# Patient Record
Sex: FEMALE | Race: WHITE | NOT HISPANIC OR LATINO | URBAN - NONMETROPOLITAN AREA
[De-identification: names, ages, dates, MRNs, and addresses within clinical notes are randomized per-mention and may not be internally consistent; named-entity substitution may affect disease eponyms.]

---

## 2018-03-14 ENCOUNTER — OFFICE VISIT CONVERTED (OUTPATIENT)
Dept: FAMILY MEDICINE CLINIC | Age: 48
End: 2018-03-14
Attending: FAMILY MEDICINE

## 2018-03-19 ENCOUNTER — CONVERSION ENCOUNTER (OUTPATIENT)
Dept: OTHER | Facility: HOSPITAL | Age: 48
End: 2018-03-19

## 2018-03-19 LAB
AGE GDLN ACOG TESTING: NORMAL
HPV I/H RISK 4 DNA CVX QL PROBE+SIG AMP: NEGATIVE

## 2018-07-23 ENCOUNTER — OFFICE VISIT CONVERTED (OUTPATIENT)
Dept: FAMILY MEDICINE CLINIC | Age: 48
End: 2018-07-23
Attending: NURSE PRACTITIONER

## 2018-09-20 ENCOUNTER — CONVERSION ENCOUNTER (OUTPATIENT)
Dept: PODIATRY | Facility: CLINIC | Age: 48
End: 2018-09-20

## 2018-09-20 ENCOUNTER — OFFICE VISIT CONVERTED (OUTPATIENT)
Dept: PODIATRY | Facility: CLINIC | Age: 48
End: 2018-09-20
Attending: PODIATRIST

## 2020-07-01 ENCOUNTER — HOSPITAL ENCOUNTER (OUTPATIENT)
Dept: OTHER | Facility: HOSPITAL | Age: 50
Discharge: HOME | End: 2020-07-01
Attending: FAMILY MEDICINE

## 2020-08-06 ENCOUNTER — OFFICE VISIT CONVERTED (OUTPATIENT)
Dept: PODIATRY | Facility: CLINIC | Age: 50
End: 2020-08-06
Attending: PODIATRIST

## 2021-05-15 VITALS
SYSTOLIC BLOOD PRESSURE: 114 MMHG | OXYGEN SATURATION: 100 % | BODY MASS INDEX: 22.02 KG/M2 | HEART RATE: 59 BPM | TEMPERATURE: 97.8 F | HEIGHT: 66 IN | WEIGHT: 137 LBS | DIASTOLIC BLOOD PRESSURE: 59 MMHG

## 2021-05-16 VITALS
OXYGEN SATURATION: 100 % | BODY MASS INDEX: 28.93 KG/M2 | HEART RATE: 57 BPM | HEIGHT: 66 IN | SYSTOLIC BLOOD PRESSURE: 116 MMHG | WEIGHT: 180 LBS | DIASTOLIC BLOOD PRESSURE: 76 MMHG

## 2021-05-18 NOTE — PROGRESS NOTES
Amisha Dillon 1970     Office/Outpatient Visit    Visit Date: Wed, Mar 14, 2018 02:39 pm    Provider: Kayleigh Jim MD (Assistant: Sweta Paula MA)    Location: [facility]        Electronically signed by Kayleigh Jim MD on  03/15/2018 10:28:51 AM                             SUBJECTIVE:        CC:     Ms. Dillon is a 47 year old White female.  WWE;         HPI:         Patient to be evaluated for well Woman Exam.  Her last physical exam was 3 years ago.  Her last menstrual period was Feb 1st, 2018.  Her current method of contraception is a vasectomy in her partner.  She performs breast self-exams occasionally.    Her last Pap smear was 3 years ago.   Her last mammogram was 2 years ago and was normal.   Her last DEXA was 1 year ago and was normal.   She has never had a flexible sigmoidoscopy or colonoscopy. She's had vision screening done in 1/2018 and this was abnormal, but is corrected with glasses.   A hearing test was done >5 years ago and results were normal.   Preventative Health updated today.  She is current with her influenza immunization.  Ms. Dillon denies any history of abnormal Pap smears.  Tobacco: She has never smoked.              PHQ-9 Depression Screening: Completed form scanned and in chart; Total Score 0/27         Additionally, she presents with history of ankle pain.  the pain is primarily in the left ankle.  The level of pain between 1 and 10 is a 5.  Swelling is absent.  Pain radiates to the calf.  She characterizes it as intermittent, moderate in intensity, and sharp.  It began 6 months ago.  The precipitating event appears to have been running, although the actual mechanism of injury is unknown.  Some pain relief is observed with tried ice, compression, stretching, home PT.  Pain is aggravated with treadmill.  Associated symptoms include paresthesias or foot weakness.  She denies associated ankle instability, crepitus, rash or swelling.  Other details: H/O achilles  tenden strain years ago.      ROS:     CONSTITUTIONAL:  Negative for chills, fatigue, fever, and weight change.      EYES:  Negative for blurred vision, eye pain, and photophobia.      E/N/T:  Negative for hearing problems, E/N/T pain, congestion, rhinorrhea, epistaxis, hoarseness, and dental problems.      CARDIOVASCULAR:  Negative for chest pain, palpitations, tachycardia, orthopnea, and edema.      RESPIRATORY:  Negative for cough, dyspnea, and hemoptysis.      GASTROINTESTINAL:  Negative for abdominal pain, heartburn, constipation, diarrhea, and stool changes.      GENITOURINARY:  Negative for genital lesions, hematuria, menstrual problems, polyuria, abnormal vaginal bleeding, and vaginal discharge.      MUSCULOSKELETAL:  Negative for arthralgias, back pain, and myalgias.      INTEGUMENTARY/BREAST:  Negative for atypical moles, dry skin, pruritis, rashes, breast masses, and nipple discharge.      NEUROLOGICAL:  Negative for dizziness, headaches, paresthesias, and weakness.      PSYCHIATRIC:  Negative for anxiety, depression, and sleep disturbances.          PMH/FMH/SH:     Last Reviewed on 2017 03:37 PM by Emely Lincoln    Past Medical History:         Fracture(s): left wrist ;         GYNECOLOGICAL HISTORY:             PREVENTIVE HEALTH MAINTENANCE             COLONOSCOPY: has never been done and has no medical need for one at this time     MAMMOGRAM: was last done      BONE DENSITY: was last done 2016 with normal results     INFLUENZA VACCINE: was last done          Surgical History:          section: X 1;         Family History:         Positive for Breast Cancer ( mat. GM ) and Lung Cancer ( mat. GF; mat. aunt ).      Positive for DEMENTIA IN [facility].          Social History:     Occupation: Homemaker     Marital Status:      Children: 2 children         Tobacco/Alcohol/Supplements:     Last Reviewed on 3/14/2018 02:43 PM by Sweta Paula    Tobacco: She has  never smoked.          Alcohol: Frequency: Socially         Substance Abuse History:     Last Reviewed on 2/14/2017 03:37 PM by Emely Lincoln        Mental Health History:     Last Reviewed on 2/14/2017 03:37 PM by Emely Lincoln        Communicable Diseases (eg STDs):     Last Reviewed on 2/14/2017 03:37 PM by Emely Lincoln            Allergies:     Last Reviewed on 3/14/2018 02:43 PM by Sweta Paula      No Known Drug Allergies.         Current Medications:     Last Reviewed on 3/14/2018 02:43 PM by Sweta Paula    Flexeril 10mg Tablet Take 1/2 to 1 tablet qhs as needed for spasm         OBJECTIVE:        Vitals:         Current: 3/14/2018 2:42:38 PM    Ht:  5 ft, 5 in;  Wt: 185.3 lbs;  BMI: 30.8    T: 98.1 F (oral);  BP: 100/68 mm Hg (left arm, sitting);  P: 55 bpm (left arm (BP Cuff), sitting);  sCr: 0.6 mg/dL;  GFR: 126.85        Exams:     PHYSICAL EXAM:     GENERAL: vital signs recorded - well developed, well nourished;  no apparent distress;     EYES: extraocular movements intact; conjunctiva and cornea are normal; PERRLA;     E/N/T:  normal EACs, TMs, nasal/oral mucosa, teeth, gingiva, and oropharynx;     NECK: range of motion is normal; thyroid is non-palpable;     RESPIRATORY: normal respiratory rate and pattern with no distress; normal breath sounds with no rales, rhonchi, wheezes or rubs;     CARDIOVASCULAR: normal rate; rhythm is regular;  no systolic murmur; no edema;     GASTROINTESTINAL: rectal exam reveals normal tone, no masses, and guaiac negative stool; nontender; normal bowel sounds; no organomegaly;     GENITOURINARY:  normal appearance of external genitalia, urethra, and cervix; no cervical motion tenderness; no adnexal tenderness or masses on bimanual exam;     LYMPHATIC: no enlargement of cervical or facial nodes; no supraclavicular nodes;     BREAST/INTEGUMENT: BREASTS: breast exam is normal without masses, skin changes, or nipple discharge; SKIN: no significant rashes  or lesions; no suspicious moles;     MUSCULOSKELETAL:  Normal range of motion, strength and tone;     NEUROLOGICAL:  cranial nerves, motor and sensory function, reflexes, gait and coordination are all intact;     PSYCHIATRIC:  appropriate affect and demeanor; normal speech pattern; grossly normal memory;         Lab/Test Results:             Glucose, Urine:  Neg (03/14/2018),     Bilirubin, urine:  Negative (03/14/2018),     Ketones, Urine Strip:  Negative (03/14/2018),     Specific Gravity, urine:  1.015 (03/14/2018),     Blood in Urine:  negative (03/14/2018),     pH, urine:  7.5 (03/14/2018),     Protein Urine QL:  negative (03/14/2018),     Urobilinogen, urine:  0.2 E.U./dL (03/14/2018),     Nitrite, Urine:  Negative (03/14/2018),     Leukoctyes, urine:  Negative (03/14/2018),     Appearance:  Clear (03/14/2018),     collection source:  Clean-catch (03/14/2018),     Color:  Yellow (03/14/2018),     Performed by::  george (03/14/2018),             ASSESSMENT:           V72.31     Well Woman Exam     V76.49  Screening for colorectal cancer              DDx:     V79.0   Z13.89  Screening for depression              DDx:     719.47   M25.572  Ankle pain              DDx:     278.02   E66.3  Overweight              DDx:         ORDERS:         Meds Prescribed:       Naprosyn (Naproxen) 500mg Tablet Take 1 tablet(s) by mouth bid  #20 (Twenty) tablet(s) Refills: 1         Radiology/Test Orders:       19134  Screening mammography bi 2-view inc CAD  (Send-Out)         77790XM  Radiologic examination, left ankle; complete minimum 3 views  (Send-Out)           Lab Orders:       03325  Cytopathology, slides, cervical or vaginal; manual screening under MD supervision  (Send-Out)         50211  Urinalysis, automated, without microscopy  (In-House)         FUTURE  Future order to be done at patients convenience  (Send-Out)         28432  [facility] PHYSICAL: CMP, CBC, TSH, LIPID: [id number], 27632, 12658, 54246  (Send-Out)          93473  Hemoccult  (In-House)                     Screening papanicolaou smear; obtaining, preparing, conveyance of cervical or vaginal smear to lab (x1)       21911  VITD - [facility] Vitamin D, 25 Hydroxy  (Send-Out)           Procedures Ordered:       66125  Handlg&/or convey of spec for TR office to lab  (In-House)           Other Orders:         Depression screen negative  (In-House)                     Cervical or vaginal cancer screening; pelvic and clinical breast examination (x1)                 PLAN:          Well Woman Exam pap performed, referal for mammogram, she is healthy, encourage her to work on diet and exercise start calcium with vitamin D         RADIOLOGY:  I have ordered screening mammogram to be done today.      FOLLOW-UP TESTING #1: FOLLOW-UP LABORATORY:  Labs to be scheduled in the future include PHYSICAL PANEL; CMP, CBC, TSH, LIPID and Vitamin D 25.            Orders:      93551  Cytopathology, slides, cervical or vaginal; manual screening under MD supervision  (Send-Out)         65001  Urinalysis, automated, without microscopy  (In-House)         FUTURE  Future order to be done at patients convenience  (Send-Out)         31457  [facility] PHYSICAL: CMP, CBC, TSH, LIPID: [id number], 60564, 95148, 07037  (Send-Out)         90820  Screening mammography bi 2-view inc CAD  (Send-Out)         00652  Handlg&/or convey of spec for TR office to lab  (In-House)         22224  VITD - [facility] Vitamin D, 25 Hydroxy  (Send-Out)            Screening for colorectal cancer           Orders:       22174  Hemoccult  (In-House)                     Cervical or vaginal cancer screening; pelvic and clinical breast examination (x1)                   Screening papanicolaou smear; obtaining, preparing, conveyance of cervical or vaginal smear to lab (x1)          Screening for depression     MIPS Negative Depression Screen           Orders:         Depression screen negative  (In-House)             Ankle pain         RADIOLOGY:  I have ordered a left ankle xray to be done today.            Prescriptions:       Naprosyn (Naproxen) 500mg Tablet Take 1 tablet(s) by mouth bid  #20 (Twenty) tablet(s) Refills: 1           Orders:       [id number]  Radiologic examination, left ankle; complete minimum 3 views  (Send-Out)            Overweight try [place] diet             Patient Recommendations:        For  Well Woman Exam:             The following laboratory testing has been ordered:         For  Ankle pain:     left ankle x-ray             CHARGE CAPTURE:           Primary Diagnosis:     V72.31    Well Woman Exam                Z01.419    Encounter for gynecological examination (general) (routine) without abnormal findings                       Orders:          49159   Preventive medicine, established patient, age 40-64 years  (In-House)             95974   Urinalysis, automated, without microscopy  (In-House)             61588   Handlg&/or convey of spec for TR office to lab  (In-House)           V76.49 Screening for colorectal cancer            Z12.12    Encounter for screening for malignant neoplasm of rectum              Orders:          58940 -25  Office/outpatient visit; established patient, level 3  (In-House)             93476   Hemoccult  (In-House)                                           Cervical or vaginal cancer screening; pelvic and clinical breast examination (x1)                                         Screening papanicolaou smear; obtaining, preparing, conveyance of cervical or vaginal smear to lab (x1)         V79.0 Screening for depression            Z13.89    Encounter for screening for other disorder              Orders:             Depression screen negative  (In-House)           719.47 Ankle pain            M25.572    Pain in left ankle and joints of left foot    278.02 Overweight            E66.3    Overweight

## 2021-05-18 NOTE — PROGRESS NOTES
Amisha Dillon 1970     Office/Outpatient Visit    Visit Date: Mon, Jul 23, 2018 09:07 am    Provider: Emely Lincoln N.P. (Assistant: Caitlyn Babb MA)    Location: [facility]        Electronically signed by Emely Lincoln N.P. on  07/23/2018 11:53:17 AM                             SUBJECTIVE:        CC:     Ms. Dillon is a 47 year old White female.  Irregular Periods;         HPI:         Patient complains of irregular periods.  Her last normal period was approximately April light period.  Previous menstrual pattern is described as irregular in frequency.  She reports that she has been having a fairly heavy period since the last part of June.  Over the past 6 months her menses have been occurring sporadically.  The patient has not had a recent pregnancy test.  Associated symptoms include bloating, clotting and constipation.  She denies diarrhea, headaches, hot flashes, nausea.  Other details: She denies any fatigue, other associated menopausal symptoms such as hot flashes or sleeping problems.          Concerning foot pain, this involves the left foot. Her symptoms are unchanged since last visit. PT reports that this may originate from her back - she did complte PT and has not had any improvement  - would like to know what the next course of treatment is  - as she is already seeing a chiropractor     ROS:     CONSTITUTIONAL:  Negative for chills, fatigue, fever, and weight change.      CARDIOVASCULAR:  Negative for chest pain, orthopnea, paroxysmal nocturnal dyspnea and pedal edema.      RESPIRATORY:  Negative for dyspnea.      GASTROINTESTINAL:  Positive for abdominal pain ( diffuse; cramping - (not really pain) ) and abdominal bloating.   Negative for constipation, diarrhea, nausea or vomiting.      MUSCULOSKELETAL:  Positive for limb pain ( left achilles pain ).      ENDOCRINE:  Negative for hot flashes and excessive sweating.      PSYCHIATRIC:  Negative for anxiety, depression, and  sleep disturbances.          PMH/FMH/SH:     Last Reviewed on 2018 09:53 AM by Emely Lincoln    Past Medical History:         Fracture(s): left wrist ;         GYNECOLOGICAL HISTORY:             PREVENTIVE HEALTH MAINTENANCE             BONE DENSITY: was last done 2016 with normal results     MAMMOGRAM: Done within last 2 years and results in are chart was last done 3/20/2018 with normal results The next one is due  next one due 3/20/20         Surgical History:          section: X 1;         Family History:         Positive for Breast Cancer ( mat. GM ) and Lung Cancer ( mat. GF; mat. aunt ).      Positive for DEMENTIA IN PAT GM.          Social History:     Occupation: Homemaker     Marital Status:      Children: 2 children         Tobacco/Alcohol/Supplements:     Last Reviewed on 2018 09:09 AM by Caitlyn Babb    Tobacco: She has never smoked.          Alcohol: Frequency: Socially         Substance Abuse History:     Last Reviewed on 2017 03:37 PM by Emely Lincoln        Mental Health History:     Last Reviewed on 2017 03:37 PM by Emely Lincoln        Communicable Diseases (eg STDs):     Last Reviewed on 2017 03:37 PM by Emely Lincoln            Current Problems:     Last Reviewed on 2018 09:53 AM by Emely Lincoln    Overweight     Foot pain     Irregular periods         Immunizations:     Fluzone (3 + years dose) 10/17/2016     Influenza Virus Vaccine pf (adult) 11/15/2017     Adacel (Tdap) 11/10/2015         Allergies:     Last Reviewed on 2018 09:11 AM by Caitlyn Babb      No Known Drug Allergies.         Current Medications:     Last Reviewed on 2018 09:10 AM by Caitlyn Babb    Flexeril 10mg Tablet Take 1/2 to 1 tablet qhs as needed for spasm         OBJECTIVE:        Vitals:         Current: 2018 9:09:17 AM    Ht:  5 ft, 5 in;  Wt: 185.7 lbs;  BMI: 30.9    T: 99.4 F (oral);  BP: 110/76 mm Hg (left arm,  sitting);  P: 66 bpm (left arm (BP Cuff), sitting);  sCr: 0.6 mg/dL;  GFR: 126.97        Exams:     PHYSICAL EXAM:     GENERAL: vital signs recorded - well developed, well nourished;  no apparent distress;     NECK: range of motion is normal; thyroid is non-palpable;     RESPIRATORY: normal respiratory rate and pattern with no distress; normal breath sounds with no rales, rhonchi, wheezes or rubs;     CARDIOVASCULAR: normal rate; rhythm is regular;  no systolic murmur; no edema;     GASTROINTESTINAL: mild diffuse tenderness;  normal bowel sounds; no organomegaly;     MUSCULOSKELETAL: normal gait; normal range of motion of all major muscle groups; pain with range of motion in: pain with ambulation;     NEUROLOGICAL:  cranial nerves, motor and sensory function, reflexes, gait and coordination are all intact;     PSYCHIATRIC:  appropriate affect and demeanor; normal speech pattern; grossly normal memory;         ASSESSMENT:           626.4   N92.6  Irregular periods              DDx:     729.5   M25.572  Foot pain              DDx:         ORDERS:         Lab Orders:       54618  BDCB2 - [facility] CBC w/o diff  (Send-Out)         03642  FSH - [facility] Follicle stimulating hormone  (Send-Out)         47685  BDPRS - [facility] HCG, Qualitative Serum  (Send-Out)                   PLAN:          Irregular periodsI feel this may be perimenopausal symptoms.  I will check a CBC and FSH today - May consider referral to GYN / or transvaginal US  for further recommendations if no improvement over the next few weeks if labs ok     LABORATORY:  Labs ordered to be performed today include CBC W/O DIFF, FSH, and Pregnancy test, serum qualitative.            Orders:       05066  BDCB2 - [facility] CBC w/o diff  (Send-Out)         02235  FSH - [facility] Follicle stimulating hormone  (Send-Out)         77820  BDPRS - [facility] HCG, Qualitative Serum  (Send-Out)            Foot pain will forward note to Dr. Jim for follow up recommendations - Previous recommendations from  xray noted to be MRI possible - however, Wanted to ensure this continues to be her recommendations given the report from PT regarding back (discussed with Dr. Jim and she recommend an EMG/NCS prior to ordering the MRI )          FOLLOW-UP TESTING #1:    FOLLOW-UP TESTS/PROCEDURES:  Will schedule an EMG and nerve conduction study.              CHARGE CAPTURE:           Primary Diagnosis:     626.4 Irregular periods            N92.6    Irregular menstruation, unspecified              Orders:          22399   Office/outpatient visit; established patient, level 3  (In-House)           729.5 Foot pain            M25.572    Pain in left ankle and joints of left foot

## 2021-07-01 VITALS
HEART RATE: 66 BPM | HEIGHT: 65 IN | SYSTOLIC BLOOD PRESSURE: 110 MMHG | TEMPERATURE: 99.4 F | WEIGHT: 185.7 LBS | BODY MASS INDEX: 30.94 KG/M2 | DIASTOLIC BLOOD PRESSURE: 76 MMHG

## 2021-07-01 VITALS
SYSTOLIC BLOOD PRESSURE: 100 MMHG | HEART RATE: 55 BPM | WEIGHT: 185.3 LBS | BODY MASS INDEX: 30.87 KG/M2 | TEMPERATURE: 98.1 F | HEIGHT: 65 IN | DIASTOLIC BLOOD PRESSURE: 68 MMHG

## 2021-07-30 DIAGNOSIS — Z12.31 SCREENING MAMMOGRAM, ENCOUNTER FOR: Primary | ICD-10-CM

## 2021-08-02 ENCOUNTER — TRANSCRIBE ORDERS (OUTPATIENT)
Dept: ADMINISTRATIVE | Facility: HOSPITAL | Age: 51
End: 2021-08-02

## 2021-08-02 DIAGNOSIS — Z12.31 ENCOUNTER FOR SCREENING MAMMOGRAM FOR BREAST CANCER: Primary | ICD-10-CM

## 2021-08-26 ENCOUNTER — HOSPITAL ENCOUNTER (OUTPATIENT)
Dept: MAMMOGRAPHY | Facility: HOSPITAL | Age: 51
Discharge: HOME | End: 2021-08-26
Admitting: FAMILY MEDICINE

## 2021-08-26 DIAGNOSIS — Z12.31 ENCOUNTER FOR SCREENING MAMMOGRAM FOR BREAST CANCER: ICD-10-CM

## 2021-08-26 PROCEDURE — 77063 BREAST TOMOSYNTHESIS BI: CPT

## 2021-08-26 PROCEDURE — 77067 SCR MAMMO BI INCL CAD: CPT

## 2023-12-13 NOTE — PROGRESS NOTES
Progress Note      Patient Name: Amisha Dillon   Patient ID: [id number]   Sex: Female   YOB: 1970    Primary Care Provider: Houston High MD   Referring Provider: Houston High MD    Visit Date: August 6, 2020    Provider: Edward Delgado DPM   Location: [facility]   Location Address: [address] [place] [address] Antonino Londono [facility]  [address]  [place], [place]  [id number]   Location Phone: [phone]          Chief Complaint  · Achilles Tendinitis      History Of Present Illness  Amisha Dillon is a 49 year old /White female who presents to the [facility] today for follow-up of similar pain in her Achilles tendon that she had a couple years ago.      New, Established, New Problem: Established  Location:  Left Achilles tendon  Duration:    Onset:  Gradual  Nature:  Achy, tender, sore  Stable, worsening, improving:  Worsening, worsening  Aggravating factors:   Pain with ambulation and shoe gear.   Previous Treatment: NSAIDs    Patient denies any fevers, chills, nausea, vomiting, nor any other constitutional signs nor symptoms.      Patient states she remains very active with exercise including Jazzercise.  She states she can do this with minimal difficulty.  Patient states her feet hurt the most when she walks.              Past Medical History  Achilles tendinitis of left lower extremity; Left ankle pain; Left foot pain         Past Surgical History  *Denies any surgical procedures; *No Past Surgical History         Medication List  diclofenac sodium 75 mg oral tablet,delayed release (DR/EC)         Allergy List  NO KNOWN DRUG ALLERGIES       Allergies Reconciled  Family Medical History  *No Known Family History         Social History  Alcohol (Light); Tobacco (Never)         Review of Systems  · Constitutional  o Denies  o : fever, chills, additional constitutional symptoms except as noted in the HPI  · Cardiovascular  o Denies  o : chest pain, irregular heart  "beats  · Respiratory  o Denies  o : shortness of breath, productive cough  · Gastrointestinal  o Denies  o : nausea, vomiting  · Integument  o Denies  o : hair growth change, new skin lesions  · Neurologic  o Denies  o : altered mental status, seizures  · Musculoskeletal  o Admits  o : joint pain, pain with shoe gear, pain with ambulation      Vitals  Date Time BP Position Site L\R Cuff Size HR RR TEMP (F) WT  HT  BMI kg/m2 BSA m2 O2 Sat HC       08/06/2020 10:57 /59 Sitting    59 - R  97.8 137lbs 0oz 5'  6\" 22.11 1.7 100 %          Physical Examination  · Constitutional  o Appearance  o : awake, alert, well developed, well nourished and well groomed  · Cardiovascular  o Peripheral Vascular System  o :   § Pedal Pulses  § : pedal pulses are 2+ and symmetrical  § Extremities  § : no edema of the lower extremities  · Musculoskeletal  o Extremeties/Joint  o : Lower extremity muscle strength and range of motion is equal and symmetrical bilaterally.   · Skin and Subcutaneous Tissue  o General Inspection  o : normal texture and turgor, with no excrenscense noted.   o Digits and Nails  o : toenails are noted to be without disease.  · Neurologic  o Sensation  o : epicritic sensations intact bilaterally  · Right Ankle/Foot  o Inspection  o : Findings within normal limits. No signs of edema erythema lymphangitis or signs of infection.  · Left Ankle/Foot  o Inspection  o : Foot Assessment Left: +TTP to the area approximately 6 cm proximal to the Left Achilles Tendon insertion. No thickening of the Achilles tendon is palpated. No weakness or guarding with range of motion or palpation of the Achilles tendon. No signs of edema, erythema, lymphangitis, nor signs of infection.                 Assessment  · Achilles tendinitis of left lower extremity     726.71/M76.62  · Left ankle pain     719.47/M25.572      Plan  · Orders  o ACO-16: BMI above normal range and follow-up plan is documented () - - 08/06/2020  o PHYSICAL " THERAPY CONSULTATION ([facility]) - - 08/06/2020  · Medications  o Medrol (Sarthak) 4 mg oral tablets,dose pack   SIG: take as directed for 6 days   DISP: (1) 21 ct dose-pack with 0 refills  Prescribed on 08/06/2020     o diclofenac sodium 75 mg oral tablet,delayed release (DR/EC)   SIG: take 1 tablet (75 mg) by oral route 2 times per day for 30 days   DISP: (60) Tablet with 1 refills  Refilled on 08/06/2020     o Medications have been Reconciled  o Transition of Care or Provider Policy  · Instructions  o Handouts provided regarding Achilles Tendinitis.  o Discuss Findings: I have discussed the findings of this evaluation with the patient. The discussion included a complete verbal explanation of any changes in the examination results, diagnosis, and the current treatment plan. A schedule for future care needs was explained. If any questions should arise after returning home, I have encouraged the patient to feel free to contact Dr. Delgado. The patient states understanding and agreement with this plan.  o Monitor For Problems: Pt to monitor for problems and to contact Dr. Delgado for follow-up should such signs occur. Patient states understanding and agreement with this plan.  o No Impact: No impact activities at this time. Otherwise, she can ambulate in supportive shoegear. Pt states understanding and agreement with this plan.   o Patient to follow-up as needed if symptoms persist or do not improve.  o Discuss Findings: I have discussed the findings of this evaluation with the patient. The discussion included a complete verbal explanation of any changes in the examination results, diagnosis, and the current treatment plan. A schedule for future care needs was explained. If any questions should arise after returning home, I have encouraged the patient to feel free to contact Dr. Delgado. The patient states understanding and agreement with this plan.  o BMI Counseling: Discussed weight loss and the need for exercise.    o Encouraged to follow-up with Primary Care Provider for preventative care.  o Proper shoe gear was discussed for her pathology.  o Electronically Identified Patient Education Materials Provided Electronically  · Disposition  o Call or Return if symptoms worsen or persist.  · Referrals  o ID: [id number] Date: 08/22/2018 Type: Inbound  Specialty: Podiatry            Electronically Signed by: Edward Delgado DPM -Author on August 6, 2020 11:13:27 AM   Detail Level: Detailed Size Of Lesion In Cm (Optional): 0

## 2024-02-15 ENCOUNTER — LAB (OUTPATIENT)
Dept: LAB | Facility: HOSPITAL | Age: 54
End: 2024-02-15
Payer: OTHER GOVERNMENT

## 2024-02-15 ENCOUNTER — OFFICE VISIT (OUTPATIENT)
Dept: FAMILY MEDICINE CLINIC | Age: 54
End: 2024-02-15
Payer: OTHER GOVERNMENT

## 2024-02-15 VITALS
TEMPERATURE: 98.6 F | SYSTOLIC BLOOD PRESSURE: 118 MMHG | HEART RATE: 55 BPM | WEIGHT: 168.4 LBS | BODY MASS INDEX: 27.06 KG/M2 | OXYGEN SATURATION: 100 % | HEIGHT: 66 IN | DIASTOLIC BLOOD PRESSURE: 63 MMHG

## 2024-02-15 DIAGNOSIS — E66.3 OVERWEIGHT (BMI 25.0-29.9): ICD-10-CM

## 2024-02-15 DIAGNOSIS — Z23 ENCOUNTER FOR IMMUNIZATION: ICD-10-CM

## 2024-02-15 DIAGNOSIS — Z00.00 ANNUAL PHYSICAL EXAM: Primary | ICD-10-CM

## 2024-02-15 DIAGNOSIS — M77.11 RIGHT TENNIS ELBOW: ICD-10-CM

## 2024-02-15 DIAGNOSIS — Z11.59 ENCOUNTER FOR SCREENING FOR OTHER VIRAL DISEASES: ICD-10-CM

## 2024-02-15 DIAGNOSIS — M72.2 PLANTAR FASCIITIS, BILATERAL: ICD-10-CM

## 2024-02-15 DIAGNOSIS — L71.9 ROSACEA: ICD-10-CM

## 2024-02-15 DIAGNOSIS — Z13.6 ENCOUNTER FOR SCREENING FOR CARDIOVASCULAR DISORDERS: ICD-10-CM

## 2024-02-15 DIAGNOSIS — E55.9 VITAMIN D DEFICIENCY: ICD-10-CM

## 2024-02-15 DIAGNOSIS — Z78.0 POSTMENOPAUSAL STATE: ICD-10-CM

## 2024-02-15 DIAGNOSIS — Z12.31 ENCOUNTER FOR SCREENING MAMMOGRAM FOR BREAST CANCER: ICD-10-CM

## 2024-02-15 DIAGNOSIS — Z12.11 COLON CANCER SCREENING: ICD-10-CM

## 2024-02-15 DIAGNOSIS — Z01.419 WELL WOMAN EXAM: ICD-10-CM

## 2024-02-15 LAB
25(OH)D3 SERPL-MCNC: 51.6 NG/ML (ref 30–100)
ALBUMIN SERPL-MCNC: 4.5 G/DL (ref 3.5–5.2)
ALBUMIN/GLOB SERPL: 1.8 G/DL
ALP SERPL-CCNC: 67 U/L (ref 39–117)
ALT SERPL W P-5'-P-CCNC: 20 U/L (ref 1–33)
ANION GAP SERPL CALCULATED.3IONS-SCNC: 10.6 MMOL/L (ref 5–15)
AST SERPL-CCNC: 27 U/L (ref 1–32)
BILIRUB SERPL-MCNC: 0.7 MG/DL (ref 0–1.2)
BILIRUB UR QL STRIP: NEGATIVE
BUN SERPL-MCNC: 18 MG/DL (ref 6–20)
BUN/CREAT SERPL: 24.7 (ref 7–25)
CALCIUM SPEC-SCNC: 9.5 MG/DL (ref 8.6–10.5)
CHLORIDE SERPL-SCNC: 105 MMOL/L (ref 98–107)
CHOLEST SERPL-MCNC: 246 MG/DL (ref 0–200)
CLARITY UR: CLEAR
CO2 SERPL-SCNC: 26.4 MMOL/L (ref 22–29)
COLOR UR: YELLOW
CREAT SERPL-MCNC: 0.73 MG/DL (ref 0.57–1)
DEPRECATED RDW RBC AUTO: 41.1 FL (ref 37–54)
EGFRCR SERPLBLD CKD-EPI 2021: 98.5 ML/MIN/1.73
ERYTHROCYTE [DISTWIDTH] IN BLOOD BY AUTOMATED COUNT: 12.5 % (ref 12.3–15.4)
GLOBULIN UR ELPH-MCNC: 2.5 GM/DL
GLUCOSE SERPL-MCNC: 91 MG/DL (ref 65–99)
GLUCOSE UR STRIP-MCNC: NEGATIVE MG/DL
HBA1C MFR BLD: 5.6 % (ref 4.8–5.6)
HCT VFR BLD AUTO: 43.1 % (ref 34–46.6)
HCV AB SER DONR QL: NORMAL
HDLC SERPL-MCNC: 114 MG/DL (ref 40–60)
HGB BLD-MCNC: 14.4 G/DL (ref 12–15.9)
HGB UR QL STRIP.AUTO: NEGATIVE
HOLD SPECIMEN: NORMAL
KETONES UR QL STRIP: ABNORMAL
LDLC SERPL CALC-MCNC: 126 MG/DL (ref 0–100)
LDLC/HDLC SERPL: 1.09 {RATIO}
LEUKOCYTE ESTERASE UR QL STRIP.AUTO: NEGATIVE
MCH RBC QN AUTO: 30.6 PG (ref 26.6–33)
MCHC RBC AUTO-ENTMCNC: 33.4 G/DL (ref 31.5–35.7)
MCV RBC AUTO: 91.7 FL (ref 79–97)
NITRITE UR QL STRIP: NEGATIVE
PH UR STRIP.AUTO: 6.5 [PH] (ref 5–8)
PLATELET # BLD AUTO: 201 10*3/MM3 (ref 140–450)
PMV BLD AUTO: 10.9 FL (ref 6–12)
POTASSIUM SERPL-SCNC: 4 MMOL/L (ref 3.5–5.2)
PROT SERPL-MCNC: 7 G/DL (ref 6–8.5)
PROT UR QL STRIP: NEGATIVE
RBC # BLD AUTO: 4.7 10*6/MM3 (ref 3.77–5.28)
SODIUM SERPL-SCNC: 142 MMOL/L (ref 136–145)
SP GR UR STRIP: 1.02 (ref 1–1.03)
T4 FREE SERPL-MCNC: 1.07 NG/DL (ref 0.93–1.7)
TRIGL SERPL-MCNC: 36 MG/DL (ref 0–150)
TSH SERPL DL<=0.05 MIU/L-ACNC: 1.89 UIU/ML (ref 0.27–4.2)
UROBILINOGEN UR QL STRIP: ABNORMAL
VLDLC SERPL-MCNC: 6 MG/DL (ref 5–40)
WBC NRBC COR # BLD AUTO: 4.84 10*3/MM3 (ref 3.4–10.8)

## 2024-02-15 PROCEDURE — 85027 COMPLETE CBC AUTOMATED: CPT

## 2024-02-15 PROCEDURE — 36415 COLL VENOUS BLD VENIPUNCTURE: CPT

## 2024-02-15 PROCEDURE — 80053 COMPREHEN METABOLIC PANEL: CPT

## 2024-02-15 PROCEDURE — 83036 HEMOGLOBIN GLYCOSYLATED A1C: CPT

## 2024-02-15 PROCEDURE — 81003 URINALYSIS AUTO W/O SCOPE: CPT | Performed by: FAMILY MEDICINE

## 2024-02-15 PROCEDURE — 84443 ASSAY THYROID STIM HORMONE: CPT

## 2024-02-15 PROCEDURE — 86803 HEPATITIS C AB TEST: CPT

## 2024-02-15 PROCEDURE — 80061 LIPID PANEL: CPT

## 2024-02-15 PROCEDURE — 84439 ASSAY OF FREE THYROXINE: CPT

## 2024-02-15 PROCEDURE — 82306 VITAMIN D 25 HYDROXY: CPT

## 2024-02-15 PROCEDURE — G0123 SCREEN CERV/VAG THIN LAYER: HCPCS | Performed by: FAMILY MEDICINE

## 2024-02-15 RX ORDER — METRONIDAZOLE 7.5 MG/G
GEL VAGINAL DAILY
Qty: 70 G | Refills: 2 | Status: SHIPPED | OUTPATIENT
Start: 2024-02-15 | End: 2024-02-19

## 2024-02-16 ENCOUNTER — TELEPHONE (OUTPATIENT)
Dept: FAMILY MEDICINE CLINIC | Age: 54
End: 2024-02-16
Payer: OTHER GOVERNMENT

## 2024-02-19 ENCOUNTER — TELEPHONE (OUTPATIENT)
Dept: FAMILY MEDICINE CLINIC | Age: 54
End: 2024-02-19
Payer: OTHER GOVERNMENT

## 2024-02-19 DIAGNOSIS — L71.9 ROSACEA: Primary | ICD-10-CM

## 2024-02-19 RX ORDER — CLINDAMYCIN PHOSPHATE 10 MG/G
1 GEL TOPICAL 2 TIMES DAILY
Qty: 60 G | Refills: 2 | Status: SHIPPED | OUTPATIENT
Start: 2024-02-19

## 2024-02-19 NOTE — TELEPHONE ENCOUNTER
Pt is calling and she does not want metrogel for  Rosacea  Comments:  Will treat with topical MetroGel  Orders:  -     metroNIDAZOLE (METROGEL) 0.75 % vaginal gel; Insert  into the vagina Daily.    She has tried that before

## 2024-02-20 LAB
CYTOLOGIST CVX/VAG CYTO: NORMAL
CYTOLOGY CVX/VAG DOC CYTO: NORMAL
DX ICD CODE: NORMAL
HIV 1 & 2 AB SER-IMP: NORMAL
OTHER STN SPEC: NORMAL
STAT OF ADQ CVX/VAG CYTO-IMP: NORMAL

## 2024-02-27 ENCOUNTER — HOSPITAL ENCOUNTER (OUTPATIENT)
Dept: MAMMOGRAPHY | Facility: HOSPITAL | Age: 54
Discharge: HOME | End: 2024-02-27
Payer: OTHER GOVERNMENT

## 2024-02-27 ENCOUNTER — HOSPITAL ENCOUNTER (OUTPATIENT)
Dept: BONE DENSITY | Facility: HOSPITAL | Age: 54
Discharge: HOME | End: 2024-02-27
Payer: OTHER GOVERNMENT

## 2024-02-27 DIAGNOSIS — Z78.0 POSTMENOPAUSAL STATE: ICD-10-CM

## 2024-02-27 DIAGNOSIS — Z12.31 ENCOUNTER FOR SCREENING MAMMOGRAM FOR BREAST CANCER: ICD-10-CM

## 2024-02-27 PROCEDURE — 77063 BREAST TOMOSYNTHESIS BI: CPT

## 2024-02-27 PROCEDURE — 77067 SCR MAMMO BI INCL CAD: CPT

## 2024-02-27 PROCEDURE — 77080 DXA BONE DENSITY AXIAL: CPT

## 2024-03-19 DIAGNOSIS — M77.11 RIGHT TENNIS ELBOW: Primary | ICD-10-CM

## 2024-07-17 ENCOUNTER — LAB (OUTPATIENT)
Dept: LAB | Facility: HOSPITAL | Age: 54
End: 2024-07-17
Payer: OTHER GOVERNMENT

## 2024-07-17 ENCOUNTER — OFFICE VISIT (OUTPATIENT)
Dept: FAMILY MEDICINE CLINIC | Age: 54
End: 2024-07-17
Payer: OTHER GOVERNMENT

## 2024-07-17 VITALS
BODY MASS INDEX: 28.32 KG/M2 | OXYGEN SATURATION: 99 % | HEIGHT: 66 IN | HEART RATE: 58 BPM | TEMPERATURE: 98.4 F | DIASTOLIC BLOOD PRESSURE: 69 MMHG | SYSTOLIC BLOOD PRESSURE: 113 MMHG | WEIGHT: 176.2 LBS

## 2024-07-17 DIAGNOSIS — M25.50 ARTHRALGIA, UNSPECIFIED JOINT: ICD-10-CM

## 2024-07-17 DIAGNOSIS — M25.50 ARTHRALGIA, UNSPECIFIED JOINT: Primary | ICD-10-CM

## 2024-07-17 LAB
ALBUMIN SERPL-MCNC: 4.5 G/DL (ref 3.5–5.2)
ALBUMIN/GLOB SERPL: 1.8 G/DL
ALP SERPL-CCNC: 79 U/L (ref 39–117)
ALT SERPL W P-5'-P-CCNC: 20 U/L (ref 1–33)
ANION GAP SERPL CALCULATED.3IONS-SCNC: 8 MMOL/L (ref 5–15)
AST SERPL-CCNC: 26 U/L (ref 1–32)
BILIRUB SERPL-MCNC: 0.4 MG/DL (ref 0–1.2)
BUN SERPL-MCNC: 24 MG/DL (ref 6–20)
BUN/CREAT SERPL: 36.4 (ref 7–25)
CALCIUM SPEC-SCNC: 9.9 MG/DL (ref 8.6–10.5)
CHLORIDE SERPL-SCNC: 106 MMOL/L (ref 98–107)
CHROMATIN AB SERPL-ACNC: 11.8 IU/ML (ref 0–14)
CK SERPL-CCNC: 70 U/L (ref 20–180)
CO2 SERPL-SCNC: 28 MMOL/L (ref 22–29)
CREAT SERPL-MCNC: 0.66 MG/DL (ref 0.57–1)
CRP SERPL-MCNC: <0.3 MG/DL (ref 0–0.5)
EGFRCR SERPLBLD CKD-EPI 2021: 105 ML/MIN/1.73
ERYTHROCYTE [SEDIMENTATION RATE] IN BLOOD: 6 MM/HR (ref 0–30)
GLOBULIN UR ELPH-MCNC: 2.5 GM/DL
GLUCOSE SERPL-MCNC: 102 MG/DL (ref 65–99)
POTASSIUM SERPL-SCNC: 4.7 MMOL/L (ref 3.5–5.2)
PROT SERPL-MCNC: 7 G/DL (ref 6–8.5)
SODIUM SERPL-SCNC: 142 MMOL/L (ref 136–145)
URATE SERPL-MCNC: 2.2 MG/DL (ref 2.4–5.7)

## 2024-07-17 PROCEDURE — 85652 RBC SED RATE AUTOMATED: CPT

## 2024-07-17 PROCEDURE — 82550 ASSAY OF CK (CPK): CPT

## 2024-07-17 PROCEDURE — 86140 C-REACTIVE PROTEIN: CPT

## 2024-07-17 PROCEDURE — 80053 COMPREHEN METABOLIC PANEL: CPT

## 2024-07-17 PROCEDURE — 86618 LYME DISEASE ANTIBODY: CPT

## 2024-07-17 PROCEDURE — 86431 RHEUMATOID FACTOR QUANT: CPT

## 2024-07-17 PROCEDURE — 84550 ASSAY OF BLOOD/URIC ACID: CPT

## 2024-07-17 PROCEDURE — 36415 COLL VENOUS BLD VENIPUNCTURE: CPT

## 2024-07-17 PROCEDURE — 86038 ANTINUCLEAR ANTIBODIES: CPT

## 2024-07-17 PROCEDURE — 99213 OFFICE O/P EST LOW 20 MIN: CPT | Performed by: FAMILY MEDICINE

## 2024-07-17 NOTE — PROGRESS NOTES
"Amisha Dillon presents to [facility].    Chief Complaint:  joint aches    Subjective     History of Present Illness:  HPI  She is worried she has lupus, c/o below symptoms with worsening symptoms.  She is not doing any activities to aggravate her joints.  She is now experiencing hand  weakness and pain years with flexion, she can't open jars.  She went to PT without any improvement.      Right elbow pain - Primary   Lateral epicondylitis of right elbow   B hand pain  R shoulder pain  Plantar fascitis  Rash on her cheeks-thought it was rosacea  She has Reynauds in her finger tips  Dry eyes    H/O tick bites about once a year      Result Review   The following data was reviewed by Kayleigh Jim MD on 07/17/2024.  Lab Results   Component Value Date    WBC 4.84 02/15/2024    HGB 14.4 02/15/2024    HCT 43.1 02/15/2024    MCV 91.7 02/15/2024     02/15/2024     Lab Results   Component Value Date    GLUCOSE 91 02/15/2024    BUN 18 02/15/2024    CREATININE 0.73 02/15/2024    EGFR 98.5 02/15/2024    BCR 24.7 02/15/2024    K 4.0 02/15/2024    CO2 26.4 02/15/2024    CALCIUM 9.5 02/15/2024    ALBUMIN 4.5 02/15/2024    BILITOT 0.7 02/15/2024    AST 27 02/15/2024    ALT 20 02/15/2024     Lab Results   Component Value Date    CHOL 246 (H) 02/15/2024    TRIG 36 02/15/2024     (H) 02/15/2024     (H) 02/15/2024     Lab Results   Component Value Date    TSH 1.890 02/15/2024     Lab Results   Component Value Date    HGBA1C 5.60 02/15/2024     No results found for: \"PSA\"      Lab Results   Component Value Date    QEZW03GI 51.6 02/15/2024               Assessment and Plan:   Diagnoses and all orders for this visit:    1. Arthralgia, unspecified joint (Primary)              Objective     Medications:  No current outpatient medications     Vital Signs:   /69 (BP Location: Right arm, Patient Position: Sitting, Cuff Size: Adult)   Pulse 58   Temp 98.4 °F (36.9 °C) (Oral)  " " Ht 167.6 cm (65.98\")   Wt 79.9 kg (176 lb 3.2 oz)   SpO2 99%   BMI 28.45 kg/m²             Physical Exam:  Physical Exam  Vitals and nursing note reviewed.   Constitutional:       General: She is not in acute distress.     Appearance: Normal appearance. She is not ill-appearing, toxic-appearing or diaphoretic.   HENT:      Head: Normocephalic and atraumatic.      Nose: No congestion or rhinorrhea.      Mouth/Throat:      Mouth: Mucous membranes are moist.      Pharynx: Oropharynx is clear. No oropharyngeal exudate or posterior oropharyngeal erythema.   Eyes:      Extraocular Movements: Extraocular movements intact.      Conjunctiva/sclera: Conjunctivae normal.      Pupils: Pupils are equal, round, and reactive to light.   Cardiovascular:      Rate and Rhythm: Normal rate and regular rhythm.      Heart sounds: Normal heart sounds.   Pulmonary:      Effort: Pulmonary effort is normal.      Breath sounds: Normal breath sounds. No wheezing, rhonchi or rales.   Abdominal:      General: Abdomen is flat.      Palpations: Abdomen is soft. There is no mass.      Tenderness: There is no abdominal tenderness.      Hernia: No hernia is present.   Musculoskeletal:      Right shoulder: Crepitus present. No swelling, tenderness or bony tenderness. Normal range of motion. Normal strength.      Left shoulder: Crepitus present. No swelling, tenderness or bony tenderness. Normal range of motion. Normal strength.      Right upper arm: Normal.      Left upper arm: Normal.      Right elbow: No swelling or deformity. Normal range of motion. Tenderness present.      Left elbow: No swelling or deformity. Normal range of motion. Tenderness present.      Right forearm: Normal.      Left forearm: Normal.      Right wrist: Normal.      Left wrist: Normal.      Right hand: Tenderness and bony tenderness present. No swelling. Normal range of motion. Decreased strength. Normal sensation. Normal pulse.      Left hand: Tenderness and bony " tenderness present. No swelling. Normal range of motion. Decreased strength. Normal sensation. Normal pulse.      Cervical back: Neck supple. No rigidity.      Right lower leg: No edema.      Left lower leg: No edema.   Lymphadenopathy:      Cervical: No cervical adenopathy.   Skin:     General: Skin is warm and dry.   Neurological:      General: No focal deficit present.      Mental Status: She is alert and oriented to person, place, and time. Mental status is at baseline.   Psychiatric:         Mood and Affect: Mood normal.         Behavior: Behavior normal.         Thought Content: Thought content normal.         Judgment: Judgment normal.           Review of Systems:  Review of Systems   Constitutional:  Negative for chills, fatigue and fever.   HENT:  Negative for ear pain, sinus pressure and sore throat.    Eyes:  Negative for blurred vision and double vision.   Respiratory:  Negative for cough, shortness of breath and wheezing.    Cardiovascular:  Negative for chest pain and palpitations.   Gastrointestinal:  Negative for abdominal pain, blood in stool, constipation, diarrhea, nausea and vomiting.   Musculoskeletal:  Positive for arthralgias.   Skin:  Negative for rash.   Neurological:  Positive for weakness. Negative for dizziness and headache.   Psychiatric/Behavioral:  Negative for sleep disturbance, suicidal ideas and depressed mood. The patient is not nervous/anxious.               Follow Up   No follow-ups on file.    Part of this note may be an electronic transcription/translation of spoken language to printed   text using the Dragon Dictation System.            Medical History:  Medications Discontinued During This Encounter   Medication Reason    clindamycin 1 % gel *Therapy completed      No past medical history on file.  Past Surgical History:     SECTION    Twins    ENDOMETRIAL ABLATION      Family History   Problem Relation Age of Onset    Cancer Maternal Grandfather         Lung cancer     Cancer Maternal Grandmother         Breast Cancer     Social History     Tobacco Use    Smoking status: Never    Smokeless tobacco: Never   Substance Use Topics    Alcohol use: Yes     Alcohol/week: 5.0 standard drinks of alcohol     Types: 5 Glasses of wine per week       Health Maintenance Due   Topic Date Due    COLORECTAL CANCER SCREENING  Never done    ZOSTER VACCINE (2 of 2) 01/07/2023        Immunization History   Administered Date(s) Administered    COVID-19 (MIGUEL A) 03/12/2021    COVID-19 (MODERNA) BIVALENT 12+YRS 11/12/2022    COVID-19 (PFIZER) Purple Cap Monovalent 11/20/2021    COVID-19 F23 (PFIZER) 12YRS+ (COMIRNATY) 10/30/2023    Flublok 18+yrs 11/11/2021, 10/30/2023    Influenza Injectable Mdck Pf Quad 11/12/2022    Shingrix 11/12/2022    Tdap 02/15/2024       No Known Allergies

## 2024-07-19 LAB
ANA SER QL: NEGATIVE
B BURGDOR IGG+IGM SER QL IA: NEGATIVE

## 2024-07-22 DIAGNOSIS — M79.642 PAIN IN BOTH HANDS: Primary | ICD-10-CM

## 2024-07-22 DIAGNOSIS — M79.641 PAIN IN BOTH HANDS: Primary | ICD-10-CM

## 2024-08-26 ENCOUNTER — TELEPHONE (OUTPATIENT)
Dept: FAMILY MEDICINE CLINIC | Age: 54
End: 2024-08-26
Payer: OTHER GOVERNMENT

## 2024-10-01 ENCOUNTER — HOSPITAL ENCOUNTER (OUTPATIENT)
Dept: GENERAL RADIOLOGY | Facility: HOSPITAL | Age: 54
Discharge: HOME | End: 2024-10-01
Admitting: FAMILY MEDICINE
Payer: OTHER GOVERNMENT

## 2024-10-01 DIAGNOSIS — M79.641 PAIN IN BOTH HANDS: ICD-10-CM

## 2024-10-01 DIAGNOSIS — M79.642 PAIN IN BOTH HANDS: ICD-10-CM

## 2024-10-01 PROCEDURE — 73130 X-RAY EXAM OF HAND: CPT
